# Patient Record
Sex: FEMALE | Race: WHITE
[De-identification: names, ages, dates, MRNs, and addresses within clinical notes are randomized per-mention and may not be internally consistent; named-entity substitution may affect disease eponyms.]

---

## 2019-06-26 ENCOUNTER — HOSPITAL ENCOUNTER (EMERGENCY)
Dept: HOSPITAL 26 - MED | Age: 34
Discharge: HOME | End: 2019-06-26
Payer: COMMERCIAL

## 2019-06-26 VITALS — DIASTOLIC BLOOD PRESSURE: 92 MMHG | SYSTOLIC BLOOD PRESSURE: 143 MMHG

## 2019-06-26 VITALS — BODY MASS INDEX: 48.82 KG/M2 | WEIGHT: 293 LBS | HEIGHT: 65 IN

## 2019-06-26 VITALS — SYSTOLIC BLOOD PRESSURE: 124 MMHG | DIASTOLIC BLOOD PRESSURE: 79 MMHG
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
## 2019-06-26 DIAGNOSIS — T78.40XA: Primary | ICD-10-CM

## 2019-06-26 PROCEDURE — 99283 EMERGENCY DEPT VISIT LOW MDM: CPT

## 2019-06-26 NOTE — NUR
PT C/O R SIDED FACIAL SWELLING STARTING TODAY AROUND 4AM. PT REPORTS EATING A 
PIECE OF CHOCOLATE. PT REPORTS NUMBNESS/TINGLING, DENIES PAIN. PT TOOK BENADRYL 
AND IBUPROFEN TODAY, BENADRYL 6:30AM, 1:45PM & IBUPROFEN 8:00AM. DENIES N/V/D; 
PT DENIES ANY FEVER, CP, SOB, OR COUGH AT THIS TIME; PATIENT STATES PAIN OF 
0/10 AT THIS TIME; VSS; PATIENT POSITIONED FOR COMFORT; HOB ELEVATED; BEDRAILS 
UP X1; BED DOWN. ER MD MADE AWARE OF PT STATUS.

## 2019-06-26 NOTE — NUR
Patient discharged with v/s stable. Written and verbal after care instructions 
given and explained. 

Patient alert, oriented and verbalized understanding of instructions. 
Ambulatory with steady gait. All questions addressed prior to discharge. ID 
band removed. Patient advised to follow up with PMD. Rx of Prednisone and 
Benadryl given. Patient educated on indication of medication including possible 
reaction and side effects. Opportunity to ask questions provided and answered.